# Patient Record
Sex: FEMALE | HISPANIC OR LATINO | URBAN - METROPOLITAN AREA
[De-identification: names, ages, dates, MRNs, and addresses within clinical notes are randomized per-mention and may not be internally consistent; named-entity substitution may affect disease eponyms.]

---

## 2023-02-22 NOTE — IMPRESSION/PLAN
Impression: Puckering of macula, left eye: H35.372. Plan: MOCT performed today consistent with mild ERM that is not operable at this time. Will plan to repeat MOCT at postop day 7 on left eye.

## 2023-02-22 NOTE — IMPRESSION/PLAN
Impression: Diabetes mellitus Type 2 without mention of complication: A96.7. Plan: Patient educated regarding pathophysiology of DM and that DM is the leading cause of preventable blindness in adults. Educated patient regarding the importance of good blood sugar control and A1c monitoring. RTC 1 year else PRN.

## 2023-02-22 NOTE — IMPRESSION/PLAN
Impression: Vitreous degeneration, bilateral: H43.813. Plan: Discussion with patient regarding posterior vitreous detachment and that while it is generally a benign condition, it can be bothersome. Patient advised of signs/symptoms associated with retinal tear/break/detachment and to [facility] ASAP.

## 2023-02-22 NOTE — IMPRESSION/PLAN
Impression: Age-related nuclear cataract, bilateral: H25.13. Plan: Patient has complaints consistent with visually significant cataracts. Will proceed with cataract surgery right eye first.  Discussed the risks, benefits, and alternatives of cataract surgery. Given that we almost never use retrobulbar anesthesia, there is typically no need to stop any anticoagulant medications when a patient gets cataract surgery with us. In most cataract surgeries performed by us we place an antibiotic and steroid at the time of surgery so most likely will not need to use any prescription post-op drops. The patient stated a full understanding and a desire to proceed with the procedure. Biometry ordered for intraocular lens selection. Advised against driving until complete. Reviewed the increased risk of retinal detachment after cataract surgery and reviewed retinal detachment and general warnings and to contact us immediately should any of those develop. CE OU, OD first.  MOCT, glare testing and refraction performed. Patient will be seeing Dr Sofi Castro for cataract extraction. A prescription for generic prednisolone acetate 1% QID in the operated eye was sent to the patient's pharmacy. Patient was advised to  the prescription and save it for surgery. I advised that we want to make sure that they have the medication before surgery. Patient expressed understanding.

## 2023-05-01 ENCOUNTER — TESTING ONLY (OUTPATIENT)
Dept: URBAN - METROPOLITAN AREA CLINIC 88 | Facility: CLINIC | Age: 68
End: 2023-05-01
Payer: MEDICARE

## 2023-05-01 DIAGNOSIS — H25.13 AGE-RELATED NUCLEAR CATARACT, BILATERAL: Primary | ICD-10-CM

## 2023-05-01 PROCEDURE — 92025 CPTRIZED CORNEAL TOPOGRAPHY: CPT | Performed by: OPHTHALMOLOGY

## 2023-05-01 PROCEDURE — 92136 OPHTHALMIC BIOMETRY: CPT | Performed by: OPHTHALMOLOGY

## 2023-05-10 ENCOUNTER — POST-OPERATIVE VISIT (OUTPATIENT)
Dept: URBAN - METROPOLITAN AREA CLINIC 89 | Facility: CLINIC | Age: 68
End: 2023-05-10
Payer: MEDICARE

## 2023-05-10 DIAGNOSIS — Z48.810 ENCOUNTER FOR SURGICAL AFTERCARE FOLLOWING SURGERY ON A SENSE ORGAN: Primary | ICD-10-CM

## 2023-05-10 PROCEDURE — 99024 POSTOP FOLLOW-UP VISIT: CPT | Performed by: OPTOMETRIST

## 2023-05-10 ASSESSMENT — INTRAOCULAR PRESSURE: OD: 17

## 2023-05-10 NOTE — IMPRESSION/PLAN
Impression: S/P CE/Standard IOL OD - 1 Day. Encounter for surgical aftercare following surgery on a sense organ  Z48.810. Plan: S/P Cataract extraction right eye with placement of punctal Dextenza and moxifloxacin intracamerally - post-op day #1 - Advised patient that likely will see floaters for 1-2 weeks. Advised no heavy lifting or strenuous activity for at least one week and no swimming for at least three weeks. Use eye shield at night for one week. Patient advised to call immediately for any problems including, but not limited to, pain, redness, increase in floaters, flashing lights, changes in peripheral vision, decreased vision, and/or any other problems or concerns. Patient stated an understanding of all the instructions. Follow-up in approximately one week / prn.

## 2023-05-16 ENCOUNTER — SURGERY (OUTPATIENT)
Dept: URBAN - METROPOLITAN AREA SURGERY 56 | Facility: SURGERY | Age: 68
End: 2023-05-16
Payer: MEDICARE

## 2023-05-17 ENCOUNTER — POST-OPERATIVE VISIT (OUTPATIENT)
Dept: URBAN - METROPOLITAN AREA CLINIC 89 | Facility: CLINIC | Age: 68
End: 2023-05-17
Payer: MEDICARE

## 2023-05-17 DIAGNOSIS — Z96.1 PRESENCE OF INTRAOCULAR LENS: Primary | ICD-10-CM

## 2023-05-17 PROCEDURE — 99024 POSTOP FOLLOW-UP VISIT: CPT | Performed by: OPTOMETRIST

## 2023-05-17 ASSESSMENT — INTRAOCULAR PRESSURE
OD: 15
OS: 20

## 2023-05-17 NOTE — IMPRESSION/PLAN
Impression: S/P CE/Standard IOL OS - 1 Day. Presence of intraocular lens  Z96.1. Plan: S/P Cataract extraction left eye with placement of punctal Dextenza and moxifloxacin intracamerally - post-op day #1 - Advised patient that likely will see floaters for 1-2 weeks. Advised no heavy lifting or strenuous activity for at least one week and no swimming for at least three weeks. Use eye shield at night for one week. Patient advised to call immediately for any problems including, but not limited to, pain, redness, increase in floaters, flashing lights, changes in peripheral vision, decreased vision, and/or any other problems or concerns. Patient stated an understanding of all the instructions. Follow-up in approximately one week / prn.   Due to significant PO edema, plan for Pred QID OS in addition to the [address]

## 2023-05-24 ENCOUNTER — POST-OPERATIVE VISIT (OUTPATIENT)
Dept: URBAN - METROPOLITAN AREA CLINIC 89 | Facility: CLINIC | Age: 68
End: 2023-05-24
Payer: MEDICARE

## 2023-05-24 DIAGNOSIS — Z96.1 PRESENCE OF INTRAOCULAR LENS: Primary | ICD-10-CM

## 2023-05-24 PROCEDURE — 99024 POSTOP FOLLOW-UP VISIT: CPT | Performed by: OPTOMETRIST

## 2023-05-24 ASSESSMENT — INTRAOCULAR PRESSURE
OD: 15
OS: 14

## 2023-08-29 ENCOUNTER — OFFICE VISIT (OUTPATIENT)
Dept: URBAN - METROPOLITAN AREA CLINIC 89 | Facility: CLINIC | Age: 68
End: 2023-08-29
Payer: COMMERCIAL

## 2023-08-29 DIAGNOSIS — H02.829 CYST OF EYELID: ICD-10-CM

## 2023-08-29 DIAGNOSIS — H11.153 PINGUECULA, BILATERAL: ICD-10-CM

## 2023-08-29 DIAGNOSIS — H17.9 CORNEAL SCAR: ICD-10-CM

## 2023-08-29 DIAGNOSIS — E11.9 DIABETES MELLITUS TYPE 2 WITHOUT MENTION OF COMPLICATION: Primary | ICD-10-CM

## 2023-08-29 DIAGNOSIS — Z96.1 PRESENCE OF INTRAOCULAR LENS: ICD-10-CM

## 2023-08-29 PROCEDURE — 92014 COMPRE OPH EXAM EST PT 1/>: CPT | Performed by: OPTOMETRIST

## 2023-08-29 ASSESSMENT — INTRAOCULAR PRESSURE
OD: 11
OS: 14

## 2024-09-24 ENCOUNTER — OFFICE VISIT (OUTPATIENT)
Dept: URBAN - METROPOLITAN AREA CLINIC 89 | Facility: CLINIC | Age: 69
End: 2024-09-24
Payer: COMMERCIAL

## 2024-09-24 DIAGNOSIS — H43.813 VITREOUS DEGENERATION, BILATERAL: ICD-10-CM

## 2024-09-24 DIAGNOSIS — D48.5 NEOPLASM OF UNCERTAIN BEHAVIOR OF SKIN: ICD-10-CM

## 2024-09-24 DIAGNOSIS — E11.9 DIABETES MELLITUS TYPE 2 WITHOUT MENTION OF COMPLICATION: Primary | ICD-10-CM

## 2024-09-24 PROCEDURE — 92014 COMPRE OPH EXAM EST PT 1/>: CPT | Performed by: OPTOMETRIST

## 2024-09-24 ASSESSMENT — KERATOMETRY
OD: 43.10
OS: 43.30

## 2024-09-24 ASSESSMENT — INTRAOCULAR PRESSURE
OS: 13
OD: 13